# Patient Record
Sex: MALE | Race: WHITE | NOT HISPANIC OR LATINO | Employment: FULL TIME | URBAN - METROPOLITAN AREA
[De-identification: names, ages, dates, MRNs, and addresses within clinical notes are randomized per-mention and may not be internally consistent; named-entity substitution may affect disease eponyms.]

---
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
## 2024-11-12 PROBLEM — F41.9 ANXIETY: Status: ACTIVE | Noted: 2023-07-05

## 2024-11-21 ENCOUNTER — OFFICE VISIT (OUTPATIENT)
Dept: ORTHOPEDIC SURGERY | Facility: CLINIC | Age: 24
End: 2024-11-21
Payer: COMMERCIAL

## 2024-11-21 VITALS
HEIGHT: 76 IN | BODY MASS INDEX: 35.31 KG/M2 | WEIGHT: 290 LBS | HEART RATE: 93 BPM | DIASTOLIC BLOOD PRESSURE: 74 MMHG | SYSTOLIC BLOOD PRESSURE: 147 MMHG

## 2024-11-21 DIAGNOSIS — M25.561 CHRONIC PAIN OF RIGHT KNEE: ICD-10-CM

## 2024-11-21 DIAGNOSIS — M25.561 RIGHT KNEE PAIN, UNSPECIFIED CHRONICITY: Primary | ICD-10-CM

## 2024-11-21 DIAGNOSIS — M23.8X1 ACL LAXITY, RIGHT: ICD-10-CM

## 2024-11-21 DIAGNOSIS — G89.29 CHRONIC PAIN OF RIGHT KNEE: ICD-10-CM

## 2024-11-21 PROCEDURE — 99203 OFFICE O/P NEW LOW 30 MIN: CPT | Performed by: ORTHOPAEDIC SURGERY

## 2024-11-21 RX ORDER — IBUPROFEN 100 MG/5ML
SUSPENSION ORAL EVERY 6 HOURS PRN
COMMUNITY

## 2024-11-21 RX ORDER — MELOXICAM 15 MG/1
15 TABLET ORAL DAILY
Qty: 30 TABLET | Refills: 5 | Status: SHIPPED | OUTPATIENT
Start: 2024-11-21

## 2024-11-21 NOTE — PROGRESS NOTES
Subjective: Chronic right knee pain     Patient ID: Ralph Erickson is a 23 y.o. male.    Chief Complaint:    History of Present Illness 22-year-old male is seen for chronic right knee pain that has had since middle school.  Playing football back in 2013 sustained an injury when he hyperflexed his knee.  Initially just had ankle pain but he subsequently has had chronic right knee pain with activity.  States he never saw  Had an evaluation but is having pain on a daily basis he describes as 5 out of 10.  Gets a shooting grinding achy dull burning type sensation sensation of the knee giving way.  Has trouble standing working walking driving climbing steps.  States he has a feeling of hyper instability as far as extension and some sensation of the knee giving way.       Social History     Occupational History    Not on file   Tobacco Use    Smoking status: Former     Types: Cigarettes     Passive exposure: Past    Smokeless tobacco: Never    Tobacco comments:     2/3 cig a day    Vaping Use    Vaping status: Never Used   Substance and Sexual Activity    Alcohol use: Yes     Comment: occasionally    Drug use: Not Currently    Sexual activity: Defer      Review of Systems   Constitutional:  Negative for chills, diaphoresis, fever and unexpected weight change.   HENT:  Negative for hearing loss, nosebleeds, sore throat and tinnitus.    Eyes:  Negative for pain and visual disturbance.   Respiratory:  Negative for cough, shortness of breath and wheezing.    Cardiovascular:  Negative for chest pain and palpitations.   Gastrointestinal:  Negative for abdominal pain, diarrhea, nausea and vomiting.   Endocrine: Negative for cold intolerance, heat intolerance and polydipsia.   Genitourinary:  Negative for difficulty urinating, dysuria and hematuria.   Musculoskeletal:  Positive for arthralgias and myalgias.   Skin:  Negative for rash and wound.   Allergic/Immunologic: Negative for environmental allergies.   Neurological:   Negative for dizziness, syncope and numbness.   Hematological:  Does not bruise/bleed easily.   Psychiatric/Behavioral:  Negative for dysphoric mood and sleep disturbance. The patient is not nervous/anxious.          History reviewed. No pertinent past medical history.  History reviewed. No pertinent surgical history.  Family History   Problem Relation Age of Onset    No Known Problems Mother     No Known Problems Father          Objective:  Vitals:    11/21/24 1325   BP: 147/74   Pulse: 93         11/21/24  1325   Weight: 132 kg (290 lb)     Body mass index is 35.3 kg/m².  Class 2 Severe Obesity (BMI >=35 and <=39.9). Obesity-related health conditions include the following:    . Obesity is    . BMI is    . We discussed portion control, increasing exercise, and   .        Ortho Exam   AP lateral sunrise view of the knee only shows a slightly shallow trochlear groove but otherwise no acute changes noted.  He is alert and oriented x 3.  Head is normocephalic and sclerae clear.  The right knee is cool to touch showing no swelling effusion erythema and he has 0 to 130 g of motion with no patellofemoral crepitus.  States he does have occasional thigh pain but is no tenderness to the quadriceps tendon at the quadricep muscle today.  Quad hamstring function is 5/5.  ±1 instability in AP testing but there is no varus or valgus instability 10 to 30 degrees.  There is no joint line tenderness and is negative Daquan's.  Calf nontender.  Good distal pulses.  Tolerates anti-inflammatories but is taken nothing a regular basis.    Assessment:        1. Right knee pain, unspecified chronicity    2. Chronic pain of right knee    3. ACL laxity, right           Plan: Reviewed at length with the patient his x-ray history and physical exam.  We will begin meloxicam 15 mg a day to see what kind response we get to the chronic knee pain but with his history of hyper flexion injury with occasional instability feeling to the knee I want to  get an MRI.  Return in 4 weeks with results of the MRI and evaluate the effects of the meloxicam.  Patient was in agreement.  Answered all questions            Work Status:    [facility] query complete.    Orders:  Orders Placed This Encounter   Procedures    XR Knee 3+ View With Staley Right    MRI Knee Right Without Contrast       Medications:  New Medications Ordered This Visit   Medications    meloxicam (MOBIC) 15 MG tablet     Sig: Take 1 tablet by mouth Daily.     Dispense:  30 tablet     Refill:  5       Followup:  Return in about 4 weeks (around 12/19/2024).          Dictated utilizing Dragon dictation

## 2024-11-22 ENCOUNTER — PATIENT ROUNDING (BHMG ONLY) (OUTPATIENT)
Dept: ORTHOPEDIC SURGERY | Facility: CLINIC | Age: 24
End: 2024-11-22
Payer: COMMERCIAL

## 2024-11-22 NOTE — PROGRESS NOTES
A My-Chart message has been sent to the patient for PATIENT ROUNDING with [facility].

## 2024-12-24 ENCOUNTER — HOSPITAL ENCOUNTER (OUTPATIENT)
Dept: MRI IMAGING | Facility: HOSPITAL | Age: 24
Discharge: HOME | End: 2024-12-24
Admitting: ORTHOPAEDIC SURGERY
Payer: COMMERCIAL

## 2024-12-24 DIAGNOSIS — G89.29 CHRONIC PAIN OF RIGHT KNEE: ICD-10-CM

## 2024-12-24 DIAGNOSIS — M23.8X1 ACL LAXITY, RIGHT: ICD-10-CM

## 2024-12-24 DIAGNOSIS — M25.561 CHRONIC PAIN OF RIGHT KNEE: ICD-10-CM

## 2024-12-24 PROCEDURE — 73721 MRI JNT OF LWR EXTRE W/O DYE: CPT

## 2025-01-02 ENCOUNTER — OFFICE VISIT (OUTPATIENT)
Dept: ORTHOPEDIC SURGERY | Facility: CLINIC | Age: 25
End: 2025-01-02
Payer: COMMERCIAL

## 2025-01-02 VITALS — WEIGHT: 291 LBS | HEIGHT: 76 IN | BODY MASS INDEX: 35.44 KG/M2

## 2025-01-02 DIAGNOSIS — G89.29 CHRONIC PAIN OF RIGHT KNEE: Primary | ICD-10-CM

## 2025-01-02 DIAGNOSIS — E88.89 HOFFA'S FAT PAD DISEASE: ICD-10-CM

## 2025-01-02 DIAGNOSIS — M25.561 CHRONIC PAIN OF RIGHT KNEE: Primary | ICD-10-CM

## 2025-01-02 PROCEDURE — 99214 OFFICE O/P EST MOD 30 MIN: CPT | Performed by: ORTHOPAEDIC SURGERY

## 2025-01-02 NOTE — PROGRESS NOTES
Subjective: Right knee pain     Patient ID: Ralph Erickson is a 24 y.o. male.    Chief Complaint:    History of Present Illness 24-year-old male returns to review the results of the MRI regarding his right knee.  He was started on meloxicam and his last visit he notes today complete relief of his pain and discomfort since his been taking the meloxicam.       Social History     Occupational History    Not on file   Tobacco Use    Smoking status: Former     Types: Cigarettes     Passive exposure: Past    Smokeless tobacco: Never    Tobacco comments:     2/3 cig a day    Vaping Use    Vaping status: Never Used   Substance and Sexual Activity    Alcohol use: Yes     Comment: occasionally    Drug use: Not Currently    Sexual activity: Defer      Review of Systems      No past medical history on file.  No past surgical history on file.  Family History   Problem Relation Age of Onset    No Known Problems Mother     No Known Problems Father          Objective:  There were no vitals filed for this visit.      01/02/25  0855   Weight: 132 kg (291 lb)     Body mass index is 35.42 kg/m².           Ortho Exam   he is alert and oriented x 3.  The knee shows no swelling effusion erythema is cool to touch.  He has 0 to 130 g of motion with no patellofemoral crepitus or pain.  He does have a slight popping or clicking in full extension but is nonpainful.  No instability in flexion extension nor any varus or valgus instability at 10 to 30 degrees.  There is no joint line tenderness.  Calf is nontender.  Good to pulses in the motor sensory deficit..  He is tolerating meloxicam.    Assessment:    MRI Knee Right Without Contrast    Result Date: 12/24/2024  Impression: 1.Focal edema signal at the superior-lateral aspect of Hoffa's fat pad which can be seen with patellar tendon-lateral femoral condyle friction syndrome. 2.Small Baker's cyst and suspected tendinopathy of the distal semimembranosus. 3.No definite findings of meniscal or  ligamentous injury. Electronically Signed: Stephen Eastman  12/24/2024 4:06 PM EST  Workstation ID: [id number]            1. Chronic pain of right knee    2. Hoffa's fat pad disease           Plan: Reviewed with the patient results of the MRI his history and findings.  Again he has Hoffa fat pad friction syndrome and he notes complete relief of the pain with the meloxicam.  I did advise him again on a soft knee sleeve with for patella support and continue the meloxicam for 6 weeks.  At that time he was asymptomatic and cancel his appointment stop the medication to be taken then only on as-needed basis.  If he still symptomatic he should return to see me.  Answered all questions          Work Status:    [facility] query complete.    Orders:  No orders of the defined types were placed in this encounter.      Medications:  No orders of the defined types were placed in this encounter.      Followup:  Return if symptoms worsen or fail to improve.          Dictated utilizing Dragon dictation